# Patient Record
Sex: MALE | URBAN - METROPOLITAN AREA
[De-identification: names, ages, dates, MRNs, and addresses within clinical notes are randomized per-mention and may not be internally consistent; named-entity substitution may affect disease eponyms.]

---

## 2022-12-07 ENCOUNTER — TELEPHONE ENCOUNTER (OUTPATIENT)
Dept: URBAN - METROPOLITAN AREA CLINIC 92 | Facility: CLINIC | Age: 42
End: 2022-12-07

## 2022-12-07 ENCOUNTER — OFFICE VISIT (OUTPATIENT)
Dept: URBAN - METROPOLITAN AREA CLINIC 111 | Facility: CLINIC | Age: 42
End: 2022-12-07
Payer: COMMERCIAL

## 2022-12-07 ENCOUNTER — WEB ENCOUNTER (OUTPATIENT)
Dept: URBAN - METROPOLITAN AREA CLINIC 111 | Facility: CLINIC | Age: 42
End: 2022-12-07

## 2022-12-07 ENCOUNTER — DASHBOARD ENCOUNTERS (OUTPATIENT)
Age: 42
End: 2022-12-07

## 2022-12-07 VITALS
BODY MASS INDEX: 24.62 KG/M2 | SYSTOLIC BLOOD PRESSURE: 133 MMHG | DIASTOLIC BLOOD PRESSURE: 86 MMHG | TEMPERATURE: 97.9 F | HEART RATE: 71 BPM | WEIGHT: 172 LBS | HEIGHT: 70 IN

## 2022-12-07 DIAGNOSIS — R93.5 ABNORMAL CT OF THE ABDOMEN: ICD-10-CM

## 2022-12-07 DIAGNOSIS — D72.829 LEUKOCYTOSIS, UNSPECIFIED TYPE: ICD-10-CM

## 2022-12-07 DIAGNOSIS — R74.8 ELEVATED LIVER ENZYMES: ICD-10-CM

## 2022-12-07 PROCEDURE — 99204 OFFICE O/P NEW MOD 45 MIN: CPT | Performed by: NURSE PRACTITIONER

## 2022-12-07 NOTE — HPI-TODAY'S VISIT:
40 yo male presents after ER visit. Reports going to ER for fever, chills, nausea, bodyaches and luq pain on 11/27/22. Records  rev'd wbc 18.3, hbg 17.9, ast 318, alt 210. US abd nml. Denies starting any new medication or supplements. Has occasional etoh 1-2x/mo, no regular tylenol/nsaids use. Been drinking mud water instead of coffee since last year. He is taking adderall for 3-4 years. Took one dose of percocet prior to ER visit. Has high stress at work. He is feeling better. Denies fever, chills, abd pain, nausea, vomiting, melena, hematochezia, or weight loss. Has occasional sharp pains in the rectum. Reports going to [facility] in March for gi symptoms of abd pain.  CT abd/pelvis on 3/5/22 revealed 13 mm soft tissue nodule within the proximal ascending colon suspicious for a polyp otherwise normal. He was recommended for colonoscopy at that time.  No fh gi malignancy or liver diseases. No prior egd/colon. Denies prior difficulty with anesthesia. Denies heart or lung diseases.

## 2022-12-27 LAB
ACTIN (SMOOTH MUSCLE) ANTIBODY (IGG): <20
HBSAG SCREEN: (no result)
HEMATOCRIT: 51.2
HEMOGLOBIN: 17.5
HEP A AB, IGM: (no result)
HEP B CORE AB, IGM: (no result)
HEP C VIRUS AB: <0.02
HEPATITIS A AB, TOTAL: (no result)
HEPATITIS B CORE AB TOTAL: (no result)
HEPATITIS C ANTIBODY: (no result)
MCH: 29.3
MCHC: 34.2
MCV: 85.8
MITOCHONDRIAL (M2) ANTIBODY: <=20
MPV: 10.6
PLATELET COUNT: 337
RDW: 12.7
RED BLOOD CELL COUNT: 5.97
WHITE BLOOD CELL COUNT: 6.9

## 2023-01-18 ENCOUNTER — OFFICE VISIT (OUTPATIENT)
Dept: URBAN - METROPOLITAN AREA LAB 3 | Facility: LAB | Age: 43
End: 2023-01-18
Payer: COMMERCIAL

## 2023-01-18 ENCOUNTER — TELEPHONE ENCOUNTER (OUTPATIENT)
Dept: URBAN - METROPOLITAN AREA CLINIC 92 | Facility: CLINIC | Age: 43
End: 2023-01-18

## 2023-01-18 DIAGNOSIS — R93.3 ABN FINDINGS-GI TRACT: ICD-10-CM

## 2023-01-18 DIAGNOSIS — D12.2 ADENOMA OF ASCENDING COLON: ICD-10-CM

## 2023-01-18 PROBLEM — 111583006: Status: ACTIVE | Noted: 2022-12-07

## 2023-01-18 PROCEDURE — 45385 COLONOSCOPY W/LESION REMOVAL: CPT | Performed by: INTERNAL MEDICINE

## 2023-01-18 PROCEDURE — 45381 COLONOSCOPY SUBMUCOUS NJX: CPT | Performed by: INTERNAL MEDICINE

## 2023-01-23 ENCOUNTER — TELEPHONE ENCOUNTER (OUTPATIENT)
Dept: URBAN - METROPOLITAN AREA CLINIC 92 | Facility: CLINIC | Age: 43
End: 2023-01-23

## 2023-01-23 ENCOUNTER — LAB OUTSIDE AN ENCOUNTER (OUTPATIENT)
Dept: URBAN - METROPOLITAN AREA CLINIC 92 | Facility: CLINIC | Age: 43
End: 2023-01-23

## 2023-03-24 ENCOUNTER — TELEPHONE ENCOUNTER (OUTPATIENT)
Dept: URBAN - METROPOLITAN AREA CLINIC 111 | Facility: CLINIC | Age: 43
End: 2023-03-24

## 2023-05-31 ENCOUNTER — OFFICE VISIT (OUTPATIENT)
Dept: URBAN - METROPOLITAN AREA LAB 3 | Facility: LAB | Age: 43
End: 2023-05-31
Payer: COMMERCIAL

## 2023-05-31 DIAGNOSIS — Z86.010 ADENOMAS PERSONAL HISTORY OF COLONIC POLYPS: ICD-10-CM

## 2023-05-31 PROCEDURE — G0105 COLORECTAL SCRN; HI RISK IND: HCPCS | Performed by: INTERNAL MEDICINE
